# Patient Record
Sex: FEMALE | Race: BLACK OR AFRICAN AMERICAN | NOT HISPANIC OR LATINO | URBAN - METROPOLITAN AREA
[De-identification: names, ages, dates, MRNs, and addresses within clinical notes are randomized per-mention and may not be internally consistent; named-entity substitution may affect disease eponyms.]

---

## 2024-05-17 ENCOUNTER — EMERGENCY (EMERGENCY)
Facility: HOSPITAL | Age: 19
LOS: 0 days | Discharge: HOME | End: 2024-05-17
Attending: STUDENT IN AN ORGANIZED HEALTH CARE EDUCATION/TRAINING PROGRAM
Payer: MEDICAID

## 2024-05-17 VITALS
HEIGHT: 66 IN | HEART RATE: 66 BPM | DIASTOLIC BLOOD PRESSURE: 72 MMHG | WEIGHT: 128.09 LBS | OXYGEN SATURATION: 99 % | TEMPERATURE: 98 F | RESPIRATION RATE: 18 BRPM | SYSTOLIC BLOOD PRESSURE: 107 MMHG

## 2024-05-17 VITALS
TEMPERATURE: 98 F | SYSTOLIC BLOOD PRESSURE: 106 MMHG | OXYGEN SATURATION: 100 % | RESPIRATION RATE: 18 BRPM | HEART RATE: 75 BPM | DIASTOLIC BLOOD PRESSURE: 65 MMHG

## 2024-05-17 DIAGNOSIS — M79.604 PAIN IN RIGHT LEG: ICD-10-CM

## 2024-05-17 DIAGNOSIS — Z98.890 OTHER SPECIFIED POSTPROCEDURAL STATES: ICD-10-CM

## 2024-05-17 DIAGNOSIS — R10.813 RIGHT LOWER QUADRANT ABDOMINAL TENDERNESS: ICD-10-CM

## 2024-05-17 DIAGNOSIS — N39.0 URINARY TRACT INFECTION, SITE NOT SPECIFIED: ICD-10-CM

## 2024-05-17 DIAGNOSIS — M25.561 PAIN IN RIGHT KNEE: ICD-10-CM

## 2024-05-17 LAB
ALBUMIN SERPL ELPH-MCNC: 3.8 G/DL — SIGNIFICANT CHANGE UP (ref 3.3–5)
ALP SERPL-CCNC: 120 U/L — SIGNIFICANT CHANGE UP (ref 40–120)
ALT FLD-CCNC: 18 U/L — SIGNIFICANT CHANGE UP (ref 12–78)
ANION GAP SERPL CALC-SCNC: 5 MMOL/L — SIGNIFICANT CHANGE UP (ref 5–17)
APPEARANCE UR: ABNORMAL
AST SERPL-CCNC: 18 U/L — SIGNIFICANT CHANGE UP (ref 15–37)
BACTERIA # UR AUTO: NEGATIVE /HPF — SIGNIFICANT CHANGE UP
BASOPHILS # BLD AUTO: 0.09 K/UL — SIGNIFICANT CHANGE UP (ref 0–0.2)
BASOPHILS NFR BLD AUTO: 0.7 % — SIGNIFICANT CHANGE UP (ref 0–2)
BILIRUB SERPL-MCNC: 0.3 MG/DL — SIGNIFICANT CHANGE UP (ref 0.2–1.2)
BILIRUB UR-MCNC: ABNORMAL
BUN SERPL-MCNC: 12 MG/DL — SIGNIFICANT CHANGE UP (ref 7–23)
CALCIUM SERPL-MCNC: 9.3 MG/DL — SIGNIFICANT CHANGE UP (ref 8.5–10.1)
CHLORIDE SERPL-SCNC: 106 MMOL/L — SIGNIFICANT CHANGE UP (ref 96–108)
CO2 SERPL-SCNC: 26 MMOL/L — SIGNIFICANT CHANGE UP (ref 22–31)
COLOR SPEC: ABNORMAL
CREAT SERPL-MCNC: 0.77 MG/DL — SIGNIFICANT CHANGE UP (ref 0.5–1.3)
DIFF PNL FLD: ABNORMAL
EGFR: 112 ML/MIN/1.73M2 — SIGNIFICANT CHANGE UP
EOSINOPHIL # BLD AUTO: 0.3 K/UL — SIGNIFICANT CHANGE UP (ref 0–0.5)
EOSINOPHIL NFR BLD AUTO: 2.2 % — SIGNIFICANT CHANGE UP (ref 0–6)
EPI CELLS # UR: PRESENT
GLUCOSE SERPL-MCNC: 91 MG/DL — SIGNIFICANT CHANGE UP (ref 70–99)
GLUCOSE UR QL: NEGATIVE MG/DL — SIGNIFICANT CHANGE UP
HCG SERPL-ACNC: <1 MIU/ML — SIGNIFICANT CHANGE UP
HCT VFR BLD CALC: 40.5 % — SIGNIFICANT CHANGE UP (ref 34.5–45)
HGB BLD-MCNC: 13.4 G/DL — SIGNIFICANT CHANGE UP (ref 11.5–15.5)
IMM GRANULOCYTES NFR BLD AUTO: 0.3 % — SIGNIFICANT CHANGE UP (ref 0–0.9)
KETONES UR-MCNC: NEGATIVE MG/DL — SIGNIFICANT CHANGE UP
LEUKOCYTE ESTERASE UR-ACNC: ABNORMAL
LYMPHOCYTES # BLD AUTO: 17.5 % — SIGNIFICANT CHANGE UP (ref 13–44)
LYMPHOCYTES # BLD AUTO: 2.36 K/UL — SIGNIFICANT CHANGE UP (ref 1–3.3)
MCHC RBC-ENTMCNC: 30.7 PG — SIGNIFICANT CHANGE UP (ref 27–34)
MCHC RBC-ENTMCNC: 33.1 G/DL — SIGNIFICANT CHANGE UP (ref 32–36)
MCV RBC AUTO: 92.7 FL — SIGNIFICANT CHANGE UP (ref 80–100)
MONOCYTES # BLD AUTO: 0.55 K/UL — SIGNIFICANT CHANGE UP (ref 0–0.9)
MONOCYTES NFR BLD AUTO: 4.1 % — SIGNIFICANT CHANGE UP (ref 2–14)
NEUTROPHILS # BLD AUTO: 10.15 K/UL — HIGH (ref 1.8–7.4)
NEUTROPHILS NFR BLD AUTO: 75.2 % — SIGNIFICANT CHANGE UP (ref 43–77)
NITRITE UR-MCNC: NEGATIVE — SIGNIFICANT CHANGE UP
NRBC # BLD: 0 /100 WBCS — SIGNIFICANT CHANGE UP (ref 0–0)
PH UR: 6.5 — SIGNIFICANT CHANGE UP (ref 5–8)
PLATELET # BLD AUTO: 384 K/UL — SIGNIFICANT CHANGE UP (ref 150–400)
POTASSIUM SERPL-MCNC: 3.8 MMOL/L — SIGNIFICANT CHANGE UP (ref 3.5–5.3)
POTASSIUM SERPL-SCNC: 3.8 MMOL/L — SIGNIFICANT CHANGE UP (ref 3.5–5.3)
PROT SERPL-MCNC: 8.7 GM/DL — HIGH (ref 6–8.3)
PROT UR-MCNC: 100 MG/DL
RBC # BLD: 4.37 M/UL — SIGNIFICANT CHANGE UP (ref 3.8–5.2)
RBC # FLD: 13.4 % — SIGNIFICANT CHANGE UP (ref 10.3–14.5)
RBC CASTS # UR COMP ASSIST: ABNORMAL /HPF
SODIUM SERPL-SCNC: 137 MMOL/L — SIGNIFICANT CHANGE UP (ref 135–145)
SP GR SPEC: 1.03 — SIGNIFICANT CHANGE UP (ref 1–1.03)
UROBILINOGEN FLD QL: 1 MG/DL — SIGNIFICANT CHANGE UP (ref 0.2–1)
WBC # BLD: 13.49 K/UL — HIGH (ref 3.8–10.5)
WBC # FLD AUTO: 13.49 K/UL — HIGH (ref 3.8–10.5)
WBC UR QL: 27 /HPF — HIGH (ref 0–5)

## 2024-05-17 PROCEDURE — 74177 CT ABD & PELVIS W/CONTRAST: CPT | Mod: 26,MC

## 2024-05-17 PROCEDURE — 76830 TRANSVAGINAL US NON-OB: CPT | Mod: 26

## 2024-05-17 PROCEDURE — 99285 EMERGENCY DEPT VISIT HI MDM: CPT

## 2024-05-17 RX ORDER — ACETAMINOPHEN 500 MG
1000 TABLET ORAL ONCE
Refills: 0 | Status: COMPLETED | OUTPATIENT
Start: 2024-05-17 | End: 2024-05-17

## 2024-05-17 RX ORDER — CEFDINIR 250 MG/5ML
1 POWDER, FOR SUSPENSION ORAL
Qty: 10 | Refills: 0
Start: 2024-05-17 | End: 2024-05-21

## 2024-05-17 RX ORDER — CEFTRIAXONE 500 MG/1
1000 INJECTION, POWDER, FOR SOLUTION INTRAMUSCULAR; INTRAVENOUS ONCE
Refills: 0 | Status: COMPLETED | OUTPATIENT
Start: 2024-05-17 | End: 2024-05-17

## 2024-05-17 RX ADMIN — Medication 1000 MILLIGRAM(S): at 14:23

## 2024-05-17 RX ADMIN — CEFTRIAXONE 1000 MILLIGRAM(S): 500 INJECTION, POWDER, FOR SOLUTION INTRAMUSCULAR; INTRAVENOUS at 17:31

## 2024-05-17 RX ADMIN — Medication 1000 MILLIGRAM(S): at 15:08

## 2024-05-17 RX ADMIN — CEFTRIAXONE 100 MILLIGRAM(S): 500 INJECTION, POWDER, FOR SOLUTION INTRAMUSCULAR; INTRAVENOUS at 17:01

## 2024-05-17 RX ADMIN — Medication 400 MILLIGRAM(S): at 14:08

## 2024-05-17 NOTE — ED ADULT TRIAGE NOTE - CHIEF COMPLAINT QUOTE
PT C/O RLQ abdominal pain for a year, got worst today. pt also c/o intermittent  rectal bleeding a month as well. denies n,v,d, constipation or gu symptoms. no history.

## 2024-05-17 NOTE — ED PROVIDER NOTE - PATIENT PORTAL LINK FT
You can access the FollowMyHealth Patient Portal offered by [facility] by registering at the following website: [email or web address]. By joining [employer]’s FollowMyHealth portal, you will also be able to view your health information using other applications (apps) compatible with our system.

## 2024-05-17 NOTE — ED PROVIDER NOTE - PHYSICAL EXAMINATION
General appearance: Nontoxic appearing, conversant, afebrile    Eyes: anicteric sclerae, AFSHIN, EOMI   HENT: Atraumatic; oropharynx clear, MMM and no ulcerations, no pharyngeal erythema or exudate   Neck: Trachea midline; Full range of motion, supple   Pulm: CTA bl, normal respiratory effort and no intercostal retractions, normal work of breathing   CV: RRR, No murmurs, rubs, or gallops   Abdomen: Soft, rlq tender, non-distended; no guarding or rebound   Extremities: No peripheral edema, no gross deformities, FROM x4   Skin: Dry, normal temperature, turgor and texture; no rash   Psych: Appropriate affect, cooperative

## 2024-05-17 NOTE — ED ADULT NURSE NOTE - NSFALLUNIVINTERV_ED_ALL_ED
Bed/Stretcher in lowest position, wheels locked, appropriate side rails in place/Call bell, personal items and telephone in reach/Instruct patient to call for assistance before getting out of bed/chair/stretcher/Non-slip footwear applied when patient is off stretcher/[place] to call system/Physically safe environment - no spills, clutter or unnecessary equipment/Purposeful proactive rounding/Room/bathroom lighting operational, light cord in reach

## 2024-05-17 NOTE — ED ADULT NURSE NOTE - OBJECTIVE STATEMENT
A&OX4. patient C/O RLQ abdominal pain  for 1x month with Nausea denies fevers/ chills or urinary symptoms. patient states intermitted pain radiating down right leg/ PMH none LMP today

## 2024-05-17 NOTE — ED PROVIDER NOTE - NSFOLLOWUPINSTRUCTIONS_ED_ALL_ED_FT
Rest, drink plenty of fluids  Advance activity as tolerated  Continue all previously prescribed medications as directed  Follow up with your PMD - bring copies of your results  Return to the ER for fevers, worsening symptoms, severe pain, or other new or concerning symptoms  For pain, you may take Tylenol 975mg every six hours and supplement with ibuprofen 600mg, with food, every six hours which can be taken three hours apart from the Tylenol to have a layered effect.  Take antibiotics as prescribed
No

## 2024-05-17 NOTE — ED PROVIDER NOTE - CARE PROVIDER_API CALL
Varinder Webb  Internal Medicine  [address] [place] [address]  Phone: [phone]  Fax: [phone]  Follow Up Time:

## 2024-05-17 NOTE — ED ADULT NURSE REASSESSMENT NOTE - NS ED NURSE REASSESS COMMENT FT1
Pt received from Santa RN, resting in bed in NAD. Expresses relief of symptoms. Pending discharge, IV line removed and vitals updated.

## 2024-05-17 NOTE — ED PROVIDER NOTE - CLINICAL SUMMARY MEDICAL DECISION MAKING FREE TEXT BOX
18yo female with no pmh presenting with RLQ, R flank pain.  Ongoing for past 2 months but much worse this morning.  Waxing and waning in severity.  Pain radiates to RLE anterior to R knee.  No numbness, weakness, trauma.  Ambulatory.  About 1 year ago had c section and is getting menstrual period now for the first time.  No other psxh.  No fever, n/v, diarrhea, constipation, urinary symptoms, discharge.  TTP RLQ, comfortable appearing otherwise in nad.  Given duration, appy, torsion seems less likely.  Will eval uti, cyst, medicate for pain.  Will start labs, urine, sono, hcg, reassess. 20yo female with no pmh presenting with RLQ, R flank pain.  Ongoing for past 2 months but much worse this morning.  Waxing and waning in severity.  Pain radiates to RLE anterior to level of R knee.  No numbness, weakness, trauma.  Ambulatory.  About 1 year ago had c section and is getting menstrual period now for the first time.  No other psxh.  No fever, n/v, diarrhea, constipation, urinary symptoms, discharge.  TTP RLQ, comfortable appearing otherwise in nad.  Given duration, appy, torsion seems less likely.  Will eval uti, cyst, medicate for pain.  Will start labs, urine, sono, hcg, reassess.

## 2024-05-17 NOTE — ED PROVIDER NOTE - PROGRESS NOTE DETAILS
Pain improved, labs non actionable, ct/ sono negative for acute pathology.  Reviewed with patient and answered questions.  Patient without concern for sti, denies recent activity or discharge.  Declined testing.  Will treat for uti and dc.

## 2024-05-19 LAB
CULTURE RESULTS: SIGNIFICANT CHANGE UP
SPECIMEN SOURCE: SIGNIFICANT CHANGE UP

## 2025-02-19 NOTE — ED ADULT TRIAGE NOTE - SPO2 (%)
[facility]    Medicine Progress Note - Hospitalist Service, GOLD TEAM 20    Date of Admission:  2/17/2025    Assessment & Plan     51 year old female admitted on 2/17/2025. She presents to the ER for abdominal pain, nausea, emesis and diarrhea in setting of markedly elevated LFT's.     Today's updates   -No acute events overnight.   -LFT's trending down. , AST 72, Alk phos 197.   -Patient still at the ER.   -No fever.   -Diarrhea is improving.   -ID evaluated the patient, input appreciated.     #Acute Hepatitis  #RUQ abd Pain with positive Velarde's sign  -- Hepatology evaluated the patient. Hepatitis multifactorial.     -- Hepatology evaluated the patient and gave the following recommendations:   -- Follow-up acute hepatitis panel, hep C PCR, EMMIE, F-actin, CMV, EBV, CK and PETH   -- Given there is insurance coverage for fidaxomicin/dificid - would recommend fidaxomicin  200mg BID x 14 days for C. Diff given its association with reduced risk of C. difficile recurrence compared to vancomycin  Limit antibiotics as able in the setting of active C. difficile  -- Hepatic panel and INR daily  -- Continue supportive care for nausea, emesis and GI symptoms.  Do not give medications to alter gut motility, such as Imodium, in the setting of active C. Difficile  -- Avoid any medications or supplements not prescribed by a physician  -- Further workup and management of iron deficiency in the setting of epistaxis per primary team; if no improvement in iron deficiency in the setting of correction of epistaxis would consider further evaluation including upper endoscopy and colonoscopy.  Would not give IV iron at this time in the setting of active infection.       #RLL Infiltrate due to CAP - Resolved.      #Acute Diarrhea  #Moderate Dehydration - Resolved.   #C. Diff colitis   --  Continue on Fidaxomicin.   --  ID following the patient.      #UTI - Resolved.     #MUNA   -- Mild Cr  "elevation. Most likely pre renal.   -- Continue on gentle hydration. Continue trending.           Diet: Regular Diet Adult    DVT Prophylaxis: Enoxaparin (Lovenox) SQ  Saleh Catheter: Not present  Lines: None     Cardiac Monitoring: None  Code Status: Full Code      Clinically Significant Risk Factors          # Hyperchloremia: Highest Cl = 115 mmol/L in last 2 days, will monitor as appropriate          # Hypoalbuminemia: Lowest albumin = 3 g/dL at 2/18/2025  6:38 AM, will monitor as appropriate                # Overweight: Estimated body mass index is 28.19 kg/m  as calculated from the following:    Height as of this encounter: 1.702 m (5' 7\").    Weight as of this encounter: 81.6 kg (180 lb)., PRESENT ON ADMISSION            Social Drivers of Health    Food Insecurity: High Risk (12/4/2024)    Food Insecurity     Within the past 12 months, did you worry that your food would run out before you got money to buy more?: Yes     Within the past 12 months, did the food you bought just not last and you didn t have money to get more?: Yes   Depression: At risk (2/11/2025)    PHQ-2     PHQ-2 Score: 3   Housing Stability: High Risk (12/4/2024)    Housing Stability     Do you have housing? : Yes     Are you worried about losing your housing?: Yes   Tobacco Use: Medium Risk (2/17/2025)    Patient History     Smoking Tobacco Use: Former     Smokeless Tobacco Use: Never     Passive Exposure: Past   Financial Resource Strain: High Risk (12/4/2024)    Financial Resource Strain     Within the past 12 months, have you or your family members you live with been unable to get utilities (heat, electricity) when it was really needed?: Yes   Transportation Needs: High Risk (12/4/2024)    Transportation Needs     Within the past 12 months, has lack of transportation kept you from medical appointments, getting your medicines, non-medical meetings or appointments, work, or from getting things that you need?: Yes          Disposition Plan "     Medically Ready for Discharge: Anticipated Tomorrow             Alek Sorto MD  Hospitalist Service, GOLD TEAM 20  [facility]  Securely message with [employer] (more info)  Text page via [employer] Paging/Directory   See signed in provider for up to date coverage information  ______________________________________________________________________    Interval History     Acute events as above.   Family at bedside.       Physical Exam   Vital Signs: Temp: 98.3  F (36.8  C) Temp src: Oral BP: 126/64 Pulse: 76   Resp: 17 SpO2: 95 % O2 Device: None (Room air)    Weight: 180 lbs 0 oz    General Appearance: Alert, room air, no acute distress  Respiratory: Normal respiratory effort. No crackles, wheezing, rhonchi.  Cardiovascular: Normal S1, S2.  GI: Obese, soft, + BS, diffuse tenderness to palpation, no rebound or guarding   Skin: No jaundice, scleral icterus, no rashes, no bruising  Other:  Motor and sensory intact     Medical Decision Making       55 MINUTES SPENT BY ME on the date of service doing chart review, history, exam, documentation & further activities per the note.      Data     I have personally reviewed the following data over the past 24 hrs:    7.6  \   10.4 (L)   / 171     140 111 (H) 10.7 /  84   4.3 19 (L) 1.04 (H) \     ALT: 193 (H) AST: 72 (H) AP: 197 (H) TBILI: 0.2   ALB: 3.2 (L) TOT PROTEIN: 6.8 LIPASE: N/A     INR:  1.04 PTT:  N/A   D-dimer:  N/A Fibrinogen:  N/A       Imaging results reviewed over the past 24 hrs:   No results found for this or any previous visit (from the past 24 hours).   99

## 2025-05-25 NOTE — ED ADULT TRIAGE NOTE - AS PAIN REST
AVS provided and reviewed with the patient. The patient verbalized understanding of care at home, follow up care, and emergent symptoms to return for. The patient verbalized understanding of completing entire medication course as prescribed. No questions or concerns verbalized at this time. The patient is alert, oriented, stable, and ambulatory out of the department at the time of discharge.     7 (severe pain)